# Patient Record
Sex: MALE | Race: WHITE | NOT HISPANIC OR LATINO | URBAN - METROPOLITAN AREA
[De-identification: names, ages, dates, MRNs, and addresses within clinical notes are randomized per-mention and may not be internally consistent; named-entity substitution may affect disease eponyms.]

---

## 2017-04-14 NOTE — PATIENT DISCUSSION
Symptoms of Retinal tear and detachment reviewed. Patient understands to call immediately with any such symptoms.

## 2017-04-25 ENCOUNTER — IMPORTED ENCOUNTER (OUTPATIENT)
Dept: URBAN - METROPOLITAN AREA CLINIC 50 | Facility: CLINIC | Age: 62
End: 2017-04-25

## 2017-09-21 NOTE — PROCEDURE NOTE: CLINICAL
PROCEDURE NOTE: YAG Capsulotomy OD. Diagnosis: Visually Significant [address]. Anesthesia: Topical. Prior to treatment, the risks/benefits/alternatives were discussed. The patient wished to proceed with procedure. Power = * mJ. Number of pulses = *. Patient tolerated procedure well. There were no complications. 1 gtt of Alphagan was instilled after laser. Post laser IOP = * mmHg. Post-procedure instructions given. Deidra Joyce

## 2017-12-19 ENCOUNTER — IMPORTED ENCOUNTER (OUTPATIENT)
Dept: URBAN - METROPOLITAN AREA CLINIC 50 | Facility: CLINIC | Age: 62
End: 2017-12-19

## 2018-01-09 ENCOUNTER — IMPORTED ENCOUNTER (OUTPATIENT)
Dept: URBAN - METROPOLITAN AREA CLINIC 50 | Facility: CLINIC | Age: 63
End: 2018-01-09

## 2018-01-23 ENCOUNTER — IMPORTED ENCOUNTER (OUTPATIENT)
Dept: URBAN - METROPOLITAN AREA CLINIC 50 | Facility: CLINIC | Age: 63
End: 2018-01-23

## 2018-01-30 ENCOUNTER — IMPORTED ENCOUNTER (OUTPATIENT)
Dept: URBAN - METROPOLITAN AREA CLINIC 50 | Facility: CLINIC | Age: 63
End: 2018-01-30

## 2018-02-12 ENCOUNTER — IMPORTED ENCOUNTER (OUTPATIENT)
Dept: URBAN - METROPOLITAN AREA CLINIC 50 | Facility: CLINIC | Age: 63
End: 2018-02-12

## 2018-02-19 ENCOUNTER — IMPORTED ENCOUNTER (OUTPATIENT)
Dept: URBAN - METROPOLITAN AREA CLINIC 50 | Facility: CLINIC | Age: 63
End: 2018-02-19

## 2018-03-15 ENCOUNTER — IMPORTED ENCOUNTER (OUTPATIENT)
Dept: URBAN - METROPOLITAN AREA CLINIC 50 | Facility: CLINIC | Age: 63
End: 2018-03-15

## 2021-04-17 ASSESSMENT — VISUAL ACUITY
OS_OTHER: 20/60. 20/60.
OD_OTHER: 20/20. 20/20.
OS_BAT: 20/60
OD_SC: 20/25
OS_SC: 20/70-
OS_SC: 20/20
OS_SC: 20/70+-
OD_SC: 20/20
OS_OTHER: 20/60. 20/400.
OS_CC: 20/30-
OD_SC: 20/20
OS_PH: 20/20
OS_SC: 20/25
OD_SC: 20/20-
OD_BAT: 20/20
OD_OTHER: 20/40. 20/60.
OS_BAT: 20/60
OD_BAT: 20/40
OS_SC: 20/60
OD_BAT: 20/20
OD_BAT: 20/40
OD_OTHER: 20/40. 20/60.
OD_SC: 20/20
OD_OTHER: 20/20. 20/25.

## 2021-04-17 ASSESSMENT — TONOMETRY
OS_IOP_MMHG: 22
OD_IOP_MMHG: 16
OD_IOP_MMHG: 16
OD_IOP_MMHG: 17
OS_IOP_MMHG: 18
OS_IOP_MMHG: 22
OS_IOP_MMHG: 35
OS_IOP_MMHG: 18
OD_IOP_MMHG: 20
OS_IOP_MMHG: 10
OS_IOP_MMHG: 20